# Patient Record
Sex: MALE | URBAN - METROPOLITAN AREA
[De-identification: names, ages, dates, MRNs, and addresses within clinical notes are randomized per-mention and may not be internally consistent; named-entity substitution may affect disease eponyms.]

---
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
## 2023-02-15 NOTE — PROCEDURE: TREATMENT REGIMEN
Otc Regimen: CeraVe lotion \\nCetaphil acne foam wash\\nPCA Skin blemish bar
Detail Level: Zone
Initiate Treatment: Acticlate 150mg\\nOnexton Qam \\nEpiduo forte qhs
No